# Patient Record
Sex: FEMALE | Race: OTHER | HISPANIC OR LATINO | Employment: FULL TIME | URBAN - METROPOLITAN AREA
[De-identification: names, ages, dates, MRNs, and addresses within clinical notes are randomized per-mention and may not be internally consistent; named-entity substitution may affect disease eponyms.]

---

## 2017-07-14 ENCOUNTER — NON-PROVIDER VISIT (OUTPATIENT)
Dept: OCCUPATIONAL MEDICINE | Facility: CLINIC | Age: 27
End: 2017-07-14

## 2017-07-14 DIAGNOSIS — Z02.1 DRUG SCREENING, PRE-EMPLOYMENT: Primary | ICD-10-CM

## 2017-07-14 DIAGNOSIS — Z02.1 PRE-EMPLOYMENT DRUG SCREENING: ICD-10-CM

## 2017-07-14 LAB
AMP AMPHETAMINE: NORMAL
BREATH ALCOHOL COMMENT: NORMAL
COC COCAINE: NORMAL
INT CON NEG: NORMAL
INT CON POS: NORMAL
MET METHAMPHETAMINES: NORMAL
OPI OPIATES: NORMAL
PCP PHENCYCLIDINE: NORMAL
POC BREATHALIZER: 0 PERCENT (ref 0–0.01)
POC DRUG COMMENT 753798-OCCUPATIONAL HEALTH: NORMAL
THC: NORMAL

## 2017-07-14 PROCEDURE — 82075 ASSAY OF BREATH ETHANOL: CPT | Performed by: PREVENTIVE MEDICINE

## 2017-07-14 PROCEDURE — 80305 DRUG TEST PRSMV DIR OPT OBS: CPT | Performed by: PREVENTIVE MEDICINE

## 2017-07-14 NOTE — PROGRESS NOTES
Patient was seen today for a pre employment d/s for zenia silverio, spoke with Candace today @ 8:11 am.

## 2017-07-14 NOTE — MR AVS SNAPSHOT
Subha Ta   2017 8:20 AM   Non-Provider Visit   MRN: [id number]    Department:  [facility]   Dept Phone:  [phone]    Description:  Female : 1990   Provider:  [facility] JENNIFER ANDRADE RMarekN.           Reason for Visit     Other drug screen pre employment [employer]      Allergies as of 2017     No Known Allergies      You were diagnosed with     Drug screening, pre-employment   [053781]  -  Primary     Pre-employment drug screening   [[id number]]         Vital Signs     Smoking Status                   Never Smoker            Basic Information     Date Of Birth Sex Race Ethnicity Preferred Language    1990 Female Other  Origin (Yoruba,Samoan,Ukrainian,Kings, etc) English      Problem List              ICD-10-CM Priority Class Noted - Resolved    Acute cholecystitis K81.0   2015 - Present    Lower urinary tract infectious disease N39.0   2015 - Present      Health Maintenance     Patient has no pending health maintenance at this time      Results     POCT Breath Alcohol Test      Component Value Standard Range & Units    POC Breathalizer 0.000 0.00 - 0.01 Percent    Breath Alcohol Comment                  POCT 6 Panel Urine Drug Screen      Component    AMPHETAMINE    POC THC    COCAINE    OPIATES    PHENCYCLIDINE    METHAMPHETAMINES    POC Urine Drug Screen Comment    NEG    Internal Control Positive    Valid    Internal Control Negative    Valid                        Current Immunizations     Influenza TIV (IM) 2014      Below and/or attached are the medications your provider expects you to take. Review all of your home medications and newly ordered medications with your provider and/or pharmacist. Follow medication instructions as directed by your provider and/or pharmacist. Please keep your medication list with you and share with your provider. Update the information when medications are discontinued, doses are changed, or new  medications (including over-the-counter products) are added; and carry medication information at all times in the event of emergency situations     Allergies:  No Known Allergies          Medications  Valid as of: July 14, 2017 -  9:25 AM    Generic Name Brand Name Tablet Size Instructions for use    Ciprofloxacin HCl (Tab) CIPRO 500 MG Take 1 Tab by mouth every 12 hours.        OxyCODONE HCl (Tab) ROXICODONE 5 MG Take 1 Tab by mouth every 3 hours as needed (Moderate Pain, CPOT 3-5).        .                 Medicines prescribed today were sent to:     None      Medication refill instructions:       If your prescription bottle indicates you have medication refills left, it is not necessary to call your provider’s office. Please contact your pharmacy and they will refill your medication.    If your prescription bottle indicates you do not have any refills left, you may request refills at any time through one of the following ways: The online Raise system (except Urgent Care), by calling your provider’s office, or by asking your pharmacy to contact your provider’s office with a refill request. Medication refills are processed only during regular business hours and may not be available until the next business day. Your provider may request additional information or to have a follow-up visit with you prior to refilling your medication.   *Please Note: Medication refills are assigned a new Rx number when refilled electronically. Your pharmacy may indicate that no refills were authorized even though a new prescription for the same medication is available at the pharmacy. Please request the medicine by name with the pharmacy before contacting your provider for a refill.           Raise Access Code: [email or web address]  Expires: 8/13/2017  9:25 AM    Raise  A secure, online tool to manage your health information     [employer] Raise® is a secure, online tool that connects you to your [employer] health  information from the privacy of your home -- day or night - making it very easy for you to manage your healthcare. Once the activation process is completed, you can even access your medical information using the [employer] neto, which is available for free in the Apple Neto store or Google Play store.     [employer] provides the following levels of access (as shown below):   My Chart Features   [facility] Doctor [facility]  Specialists [facility]  Urgent  Care Non-[facility]  Primary Care  Doctor   Email your healthcare team securely and privately 24/7 X X X    Manage appointments: schedule your next appointment; view details of past/upcoming appointments X      Request prescription refills. X      View recent personal medical records, including lab and immunizations X X X X   View health record, including health history, allergies, medications X X X X   Read reports about your outpatient visits, procedures, consult and ER notes X X X X   See your discharge summary, which is a recap of your hospital and/or ER visit that includes your diagnosis, lab results, and care plan. X X       How to register for [employer]:  1. Go to  [email or web address].  2. Click on the Sign Up Now box, which takes you to the New Member Sign Up page. You will need to provide the following information:  a. Enter your [employer] Access Code exactly as it appears at the top of this page. (You will not need to use this code after you’ve completed the sign-up process. If you do not sign up before the expiration date, you must request a new code.)   b. Enter your date of birth.   c. Enter your home email address.   d. Click Submit, and follow the next screen’s instructions.  3. Create a [employer] ID. This will be your [employer] login ID and cannot be changed, so think of one that is secure and easy to remember.  4. Create a [employer] password. You can change your password at any time.  5. Enter your Password Reset Question and Answer. This can be used at a later  time if you forget your password.   6. Enter your e-mail address. This allows you to receive e-mail notifications when new information is available in [employer].  7. Click Sign Up. You can now view your health information.    For assistance activating your [employer] account, call [phone]

## 2018-04-02 ENCOUNTER — OFFICE VISIT (OUTPATIENT)
Dept: URGENT CARE | Facility: PHYSICIAN GROUP | Age: 28
End: 2018-04-02
Payer: COMMERCIAL

## 2018-04-02 VITALS
WEIGHT: 165 LBS | HEART RATE: 86 BPM | SYSTOLIC BLOOD PRESSURE: 118 MMHG | OXYGEN SATURATION: 100 % | HEIGHT: 69 IN | TEMPERATURE: 97.9 F | BODY MASS INDEX: 24.44 KG/M2 | DIASTOLIC BLOOD PRESSURE: 80 MMHG

## 2018-04-02 DIAGNOSIS — G56.91 NEUROPATHY, ARM, RIGHT: ICD-10-CM

## 2018-04-02 PROCEDURE — 99203 OFFICE O/P NEW LOW 30 MIN: CPT | Performed by: EMERGENCY MEDICINE

## 2018-04-02 RX ORDER — NAPROXEN 500 MG/1
500 TABLET ORAL
Qty: 14 TAB | Refills: 0 | Status: SHIPPED | OUTPATIENT
Start: 2018-04-02

## 2018-04-02 RX ORDER — ROPINIROLE 1 MG/1
TABLET, FILM COATED ORAL
COMMUNITY
Start: 2018-03-22

## 2018-04-02 RX ORDER — LISINOPRIL 20 MG/1
TABLET ORAL
COMMUNITY
Start: 2018-03-11

## 2018-04-02 ASSESSMENT — ENCOUNTER SYMPTOMS
FOCAL WEAKNESS: 1
NECK PAIN: 0
MUSCLE WEAKNESS: 1
HEADACHES: 0
NUMBNESS: 1
FEVER: 0
SENSORY CHANGE: 1
TINGLING: 1

## 2018-04-02 NOTE — PROGRESS NOTES
Subjective:      Subha Ta is a 27 y.o. female who presents with Arm Pain (onset sat. R arm pain, hand tingling, sore, numbing)            Arm Pain    Incident onset: 3 days. The incident occurred at home. Injury mechanism: awakened with symptoms. The pain is present in the upper right arm, right forearm, right wrist and right hand. The quality of the pain is described as aching. The pain does not radiate. The pain has been constant since the incident. Associated symptoms include muscle weakness, numbness and tingling. Pertinent negatives include no chest pain. The symptoms are aggravated by lifting. She has tried rest for the symptoms. The treatment provided no relief.   Denies trauma; denies unusual provocation at sleep the night before. Notes occasional episodes of hand or wrist paresthesias upon awakening.    Review of Systems   Constitutional: Negative for fever.   Cardiovascular: Negative for chest pain.   Musculoskeletal: Negative for joint pain and neck pain.   Skin: Negative for rash.   Neurological: Positive for tingling, sensory change, focal weakness and numbness. Negative for headaches.     PMH:  has no past medical history on file.  MEDS:   Current Outpatient Prescriptions:   •  ROPINIRole (REQUIP) 1 MG Tab, , Disp: , Rfl:   •  lisinopril (PRINIVIL) 20 MG Tab, , Disp: , Rfl:   •  sertraline (ZOLOFT) 50 MG Tab, , Disp: , Rfl:   •  naproxen (NAPROSYN) 500 MG Tab, Take 1 Tab by mouth 2 times daily with meals as needed., Disp: 14 Tab, Rfl: 0  •  oxycodone immediate release (ROXICODONE) 5 MG TABS, Take 1 Tab by mouth every 3 hours as needed (Moderate Pain, CPOT 3-5)., Disp: 30 Tab, Rfl: 0  •  ciprofloxacin (CIPRO) 500 MG TABS, Take 1 Tab by mouth every 12 hours., Disp: 10 Tab, Rfl: 0  ALLERGIES: No Known Allergies  SURGHX:   Past Surgical History:   Procedure Laterality Date   • FRANSISCO BY LAPAROSCOPY  1/30/2015    Performed by Jack Nicholson M.D. at [facility]     SOCHX:  reports  "that she has never smoked. She has never used smokeless tobacco. She reports that she drinks alcohol. She reports that she does not use drugs.  FH: family history is not on file.       Objective:     /80   Pulse 86   Temp 36.6 °C (97.9 °F)   Ht 1.753 m (5' 9\")   Wt 74.8 kg (165 lb)   SpO2 100%   BMI 24.37 kg/m²      Physical Exam   Constitutional: She appears well-developed and well-nourished. She is cooperative.  Non-toxic appearance. She does not have a sickly appearance.   HENT:   Head: Normocephalic and atraumatic.   Neck: Full passive range of motion without pain and phonation normal. No spinous process tenderness and no muscular tenderness present.   Negative Spurling's test.   Cardiovascular:   Pulses:       Radial pulses are 2+ on the right side, and 2+ on the left side.   Pulmonary/Chest: Effort normal.   Neurological: She is alert. She displays no tremor. A sensory deficit is present. She exhibits abnormal muscle tone.   Mild decreased light touch sensation right dorsal first webspace region. Diffuse distal weakness right hand and wrist region; no proximal weakness.   Skin: Skin is warm, dry and intact. No rash noted.   Psychiatric: She has a normal mood and affect.               Assessment/Plan:     1. Neuropathy, arm, right  Most likely compression neuropathy; advised avoidance of possible axillary or upper arm pressure at sleep.  Advised need for PCP follow-up if not resolving.  - naproxen (NAPROSYN) 500 MG Tab; Take 1 Tab by mouth 2 times daily with meals as needed.  Dispense: 14 Tab; Refill: 0      "

## 2018-04-02 NOTE — PATIENT INSTRUCTIONS
Discontinue naproxen if any persistent gastrointestinal upset.  You should contact a primary care provider for follow-up if not resolving in 5-7 days.    Neuropathic Pain  Introduction  Neuropathic pain is pain caused by damage to the nerves that are responsible for certain sensations in your body (sensory nerves). The pain can be caused by damage to:  · The sensory nerves that send signals to your spinal cord and brain (peripheral nervous system).  · The sensory nerves in your brain or spinal cord (central nervous system).  Neuropathic pain can make you more sensitive to pain. What would be a minor sensation for most people may feel very painful if you have neuropathic pain. This is usually a long-term condition that can be difficult to treat. The type of pain can differ from person to person. It may start suddenly (acute), or it may develop slowly and last for a long time (chronic). Neuropathic pain may come and go as damaged nerves heal or may stay at the same level for years. It often causes emotional distress, loss of sleep, and a lower quality of life.  What are the causes?  The most common cause of damage to a sensory nerve is diabetes. Many other diseases and conditions can also cause neuropathic pain. Causes of neuropathic pain can be classified as:  · Toxic. Many drugs and chemicals can cause toxic damage. The most common cause of toxic neuropathic pain is damage from drug treatment for cancer (chemotherapy).  · Metabolic. This type of pain can happen when a disease causes imbalances that damage nerves. Diabetes is the most common of these diseases. Vitamin B deficiency caused by long-term alcohol abuse is another common cause.  · Traumatic. Any injury that cuts, crushes, or stretches a nerve can cause damage and pain. A common example is feeling pain after losing an arm or leg (phantom limb pain).  · Compression-related. If a sensory nerve gets trapped or compressed for a long period of time, the blood  supply to the nerve can be cut off.  · Vascular. Many blood vessel diseases can cause neuropathic pain by decreasing blood supply and oxygen to nerves.  · Autoimmune. This type of pain results from diseases in which the body’s defense system mistakenly attacks sensory nerves. Examples of autoimmune diseases that can cause neuropathic pain include lupus and multiple sclerosis.  · Infectious. Many types of viral infections can damage sensory nerves and cause pain. Shingles infection is a common cause of this type of pain.  · Inherited. Neuropathic pain can be a symptom of many diseases that are passed down through families (genetic).  What are the signs or symptoms?  The main symptom is pain. Neuropathic pain is often described as:  · Burning.  · Shock-like.  · Stinging.  · Hot or cold.  · Itching.  How is this diagnosed?  No single test can diagnose neuropathic pain. Your health care provider will do a physical exam and ask you about your pain. You may use a pain scale to describe how bad your pain is. You may also have tests to see if you have a high sensitivity to pain and to help find the cause and location of any sensory nerve damage. These tests may include:  · Imaging studies, such as:  ¨ X-rays.  ¨ CT scan.  ¨ MRI.  · Nerve conduction studies to test how well nerve signals travel through your sensory nerves (electrodiagnostic testing).  · Stimulating your sensory nerves through electrodes on your skin and measuring the response in your spinal cord and brain (somatosensory evoked potentials).  How is this treated?  Treatment for neuropathic pain may change over time. You may need to try different treatment options or a combination of treatments. Some options include:  · Over-the-counter pain relievers.  · Prescription medicines. Some medicines used to treat other conditions may also help neuropathic pain. These include medicines to:  ¨ Control seizures (anticonvulsants).  ¨ Relieve depression  (antidepressants).  · Prescription-strength pain relievers (narcotics). These are usually used when other pain relievers do not help.  · Transcutaneous nerve stimulation (TENS). This uses electrical currents to block painful nerve signals. The treatment is painless.  · Topical and local anesthetics. These are medicines that numb the nerves. They can be injected as a nerve block or applied to the skin.  · Alternative treatments, such as:  ¨ Acupuncture.  ¨ Meditation.  ¨ Massage.  ¨ Physical therapy.  ¨ Pain management programs.  ¨ Counseling.  Follow these instructions at home:  · Learn as much as you can about your condition.  · Take medicines only as directed by your health care provider.  · Work closely with all your health care providers to find what works best for you.  · Have a good support system at home.  · Consider joining a chronic pain support group.  Contact a health care provider if:  · Your pain treatments are not helping.  · You are having side effects from your medicines.  · You are struggling with fatigue, mood changes, depression, or anxiety.  This information is not intended to replace advice given to you by your health care provider. Make sure you discuss any questions you have with your health care provider.  Document Released: 09/14/2005 Document Revised: 07/07/2017 Document Reviewed: 05/28/2015  © 2017 [employer]